# Patient Record
Sex: FEMALE | Race: WHITE | URBAN - METROPOLITAN AREA
[De-identification: names, ages, dates, MRNs, and addresses within clinical notes are randomized per-mention and may not be internally consistent; named-entity substitution may affect disease eponyms.]

---

## 2018-08-06 ENCOUNTER — TELEPHONE (OUTPATIENT)
Dept: DERMATOLOGY | Age: 23
End: 2018-08-06

## 2018-08-07 ENCOUNTER — OFFICE VISIT (OUTPATIENT)
Dept: DERMATOLOGY | Age: 23
End: 2018-08-07

## 2018-08-07 DIAGNOSIS — L70.0 ACNE VULGARIS: Primary | ICD-10-CM

## 2018-08-07 DIAGNOSIS — Z51.81 MEDICATION MONITORING ENCOUNTER: ICD-10-CM

## 2018-08-07 PROCEDURE — 99213 OFFICE O/P EST LOW 20 MIN: CPT | Performed by: DERMATOLOGY

## 2018-08-07 RX ORDER — SPIRONOLACTONE 50 MG/1
TABLET, FILM COATED ORAL
Qty: 60 TABLET | Refills: 3 | Status: SHIPPED | OUTPATIENT
Start: 2018-08-07

## 2018-08-07 NOTE — PROGRESS NOTES
[facility]  Rani Lepe MD  [phone]      Wesley Jenkins  1995    24 y.o. female     Date of Visit: 8/7/2018    Chief Complaint: acne  Chief Complaint   Patient presents with    Acne     flaring, painful, starting to get worse      Last seen:     History of Present Illness:    1 Here for f/u for acne. Flaring recently. previously moderate - severe facial acne. She completed 6 months of isotretinoin in 7-2015 and had remained fairly clear until the past 2-3 mos when started flaring again with deep tender lesions on the face, jawline. Denies association with periods. No trx recently. She has mild scarring remaining but this does not particularly bother her currently. *Lipids were elevated in the 400s in the past and her ASt and ALT were slightly elevated (38 and 50). reports had them rechecked and followed by PCP. Previously addressed - mild HS in the groin. Isotretinoin course:  Month 1: 40 mg daily, completed 1-13-15  Month 2: 80 mg daily, completed 2-19-15  Month 3: 80 mg daily, completed 3-26-15  Month 4: 80 mg daily, completed 4-28-15  Month 5: 80 mg daily, completed 6-1-15  - cumulative dose 10,800 mg  Month 6: 80 mg daily, completed 7-2015  - cumulative dose 13,200 mg    Other hx:  She previously tried OCP's, aczone, tretinoin, benzamycin, finacea and marjan (diarrhea) and keflex in the past as well as proactiv. She had noted worsening with menses but has breakouts between periods. Hx of likely HS (waxing/waning flares of intermittently tender lesions on the upper medial thighs, leaving scars). No topical trx tried. She has been on trx for acne w/o improvement. She denies lesions in the axilla. No significant changes over the past few mos with starting OCP's. Review of Systems:  Gen: Feels well, good sense of health.   Skin: no new rashes or changing moles    Past Medical History, Family History, Surgical History, Medications and Allergies reviewed. History reviewed. No pertinent past medical history. History reviewed. No pertinent surgical history. Outpatient Prescriptions Marked as Taking for the 8/7/18 encounter (Office Visit) with Geovanny Guillory MD   Medication Sig Dispense Refill    cetirizine (ZYRTEC) 10 MG tablet Take 10 mg by mouth daily.  fluticasone (FLONASE) 50 MCG/ACT nasal spray 2 sprays by Nasal route daily. 1 Bottle 3       No Known Allergies      Physical Examination     Gen, well-appearing  The following were examined and determined to be normal: Psych/Neuro, Conjunctivae/eyelids, lips and Neck, chest  The following were examined and determined to be abnormal: Head/fac    Cheeks, chin with erythematous macules, papules and few comedones    Baseline photo on the L - not as bad today but flaring      Assessment and Plan     1. Acne, moderate - severe inflammatory and moderate comedonal with focal scarring, clear after isotretinoin except mild - focally mod scarring  - discussed trx options - prefers not to do oral abx  - epiduo forte sampled - use daily; ed irritation and photosensitivity  - spironolactone 50 mg bid if baseline K wnl   check K now and annually  educ risk K abnl, breast tenderness, dizziness, abnl menses; ed NO preg    F/u 4 mos.